# Patient Record
Sex: MALE
[De-identification: names, ages, dates, MRNs, and addresses within clinical notes are randomized per-mention and may not be internally consistent; named-entity substitution may affect disease eponyms.]

---

## 2022-11-28 PROBLEM — Z00.00 ENCOUNTER FOR PREVENTIVE HEALTH EXAMINATION: Status: ACTIVE | Noted: 2022-11-28

## 2022-12-05 ENCOUNTER — APPOINTMENT (OUTPATIENT)
Dept: DERMATOLOGY | Facility: CLINIC | Age: 47
End: 2022-12-05

## 2022-12-05 DIAGNOSIS — L98.8 OTHER SPECIFIED DISORDERS OF THE SKIN AND SUBCUTANEOUS TISSUE: ICD-10-CM

## 2022-12-05 PROCEDURE — 99203 OFFICE O/P NEW LOW 30 MIN: CPT | Mod: 95

## 2022-12-05 NOTE — HISTORY OF PRESENT ILLNESS
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

## 2022-12-05 NOTE — ASSESSMENT
[FreeTextEntry1] : unclear by history if lymphoma or pseudolymphoma\par \par discussed etiology of pseudolymphoma with pt\par currently nothing there, and also PET scan negative, blood work negative\par \par pt to f/u with Dr. Wheeler\par or he may consider seeing Dr. Lopez at [facility] as well\par \par Verbal consent was obtained for this telemedicine encounter. This visit was conducted via live synchronous audio/video telehealth with the patient and provider. The patient attested to being located in [facility] where the provider is also currently located and licensed to practice medicine.\par

## 2022-12-06 ENCOUNTER — APPOINTMENT (OUTPATIENT)
Dept: DERMATOLOGY | Facility: CLINIC | Age: 47
End: 2022-12-06

## 2022-12-07 ENCOUNTER — TRANSCRIPTION ENCOUNTER (OUTPATIENT)
Age: 47
End: 2022-12-07